# Patient Record
Sex: MALE | Employment: STUDENT | URBAN - METROPOLITAN AREA
[De-identification: names, ages, dates, MRNs, and addresses within clinical notes are randomized per-mention and may not be internally consistent; named-entity substitution may affect disease eponyms.]

---

## 2017-11-21 ENCOUNTER — OFFICE VISIT (OUTPATIENT)
Dept: INTERNAL MEDICINE CLINIC | Facility: CLINIC | Age: 22
End: 2017-11-21

## 2017-11-21 VITALS
OXYGEN SATURATION: 99 % | DIASTOLIC BLOOD PRESSURE: 60 MMHG | HEIGHT: 74 IN | HEART RATE: 83 BPM | BODY MASS INDEX: 23.87 KG/M2 | WEIGHT: 186 LBS | SYSTOLIC BLOOD PRESSURE: 110 MMHG

## 2017-11-21 DIAGNOSIS — Z00.00 ROUTINE PHYSICAL EXAMINATION: Primary | ICD-10-CM

## 2017-11-21 PROCEDURE — 99385 PREV VISIT NEW AGE 18-39: CPT | Performed by: INTERNAL MEDICINE

## 2017-11-21 RX ORDER — ALBUTEROL SULFATE 90 UG/1
AEROSOL, METERED RESPIRATORY (INHALATION)
Qty: 1 INHALER | Refills: 0 | COMMUNITY
Start: 2017-11-21

## 2017-11-21 NOTE — PROGRESS NOTES
Luis Enrique Magaña is a 25year old male who presents for a complete physical exam.   HPI:       Wt Readings from Last 6 Encounters:  11/21/17 : 186 lb  01/22/15 : 180 lb (85 %, Z= 1.05)*    * Growth percentiles are based on CDC 2-20 Years data.   Body mass ind adenopathy,no bruits, no TM  CHEST: no chest tenderness  BREAST: no gynecomastia  LUNGS: clear to auscultation and percussion  CARDIO: [id number] no S3S4,no murmur PMI nondisplaced  GI: Soft+BSNTND no HSM, no abdominal bruits  : no penile lesions, no testic

## (undated) NOTE — LETTER
12/21/17 [address] [id number]    10/25/1995     Dear Dax Shin records indicate that you have outstanding lab work and or testing that was ordered for you and has not yet been completed:          Lipid Pa